# Patient Record
Sex: MALE | Race: WHITE | NOT HISPANIC OR LATINO | Employment: FULL TIME | URBAN - METROPOLITAN AREA
[De-identification: names, ages, dates, MRNs, and addresses within clinical notes are randomized per-mention and may not be internally consistent; named-entity substitution may affect disease eponyms.]

---

## 2024-09-16 ENCOUNTER — OFFICE VISIT (OUTPATIENT)
Dept: ORTHOPEDIC SURGERY | Facility: CLINIC | Age: 24
End: 2024-09-16
Payer: COMMERCIAL

## 2024-09-16 ENCOUNTER — HOSPITAL ENCOUNTER (OUTPATIENT)
Dept: RADIOLOGY | Facility: CLINIC | Age: 24
Discharge: HOME | End: 2024-09-16
Payer: COMMERCIAL

## 2024-09-16 DIAGNOSIS — M75.21 BICEPS TENDONITIS OF BOTH SHOULDERS: Primary | ICD-10-CM

## 2024-09-16 DIAGNOSIS — M25.512 BILATERAL SHOULDER PAIN, UNSPECIFIED CHRONICITY: ICD-10-CM

## 2024-09-16 DIAGNOSIS — M25.511 BILATERAL SHOULDER PAIN, UNSPECIFIED CHRONICITY: ICD-10-CM

## 2024-09-16 DIAGNOSIS — M75.22 BICEPS TENDONITIS OF BOTH SHOULDERS: Primary | ICD-10-CM

## 2024-09-16 PROCEDURE — 99203 OFFICE O/P NEW LOW 30 MIN: CPT | Performed by: ORTHOPAEDIC SURGERY

## 2024-09-16 PROCEDURE — 73030 X-RAY EXAM OF SHOULDER: CPT | Mod: BILATERAL PROCEDURE | Performed by: RADIOLOGY

## 2024-09-16 PROCEDURE — 73030 X-RAY EXAM OF SHOULDER: CPT | Mod: 50

## 2024-09-16 PROCEDURE — 99213 OFFICE O/P EST LOW 20 MIN: CPT | Performed by: ORTHOPAEDIC SURGERY

## 2024-09-16 RX ORDER — MELOXICAM 15 MG/1
15 TABLET ORAL DAILY
Qty: 30 TABLET | Refills: 1 | Status: SHIPPED | OUTPATIENT
Start: 2024-09-16 | End: 2025-09-16

## 2024-09-16 NOTE — PROGRESS NOTES
ORTHOPAEDIC HISTORY AND PHYSICAL    History Of Present Illness  Orthopaedic Problems/Injuries:  Corby Mackay is a 24 y.o. male presenting with bilateral shoulder pain.  The pain has been going on for the last 3 weeks.  Patient reported that he has been doing a lot of weight lifting and about 3 weeks ago he was doing a push-up and felt a pop in the left shoulder.  He had transient weakness in the shoulder however has regained his strength since then.  He has been having progressive shoulder pain.  Pain is worse with activities.  He has stopped lifting since the injury.  He has no weakness in the shoulder currently.  He is having difficulty sleeping on either shoulder.  He is taking ibuprofen without relief.  No numbness or tingling the hand.  No shoulder instability.      Review of Systems: 12 point ROS negative unless stated in HPI    Past Medical History  He has a past medical history of Concussion without loss of consciousness, initial encounter (10/12/2017) and Other specified health status.    Surgical History  He has no past surgical history on file.     Social History  He has no history on file for tobacco use, alcohol use, and drug use.    Family History  No family history on file.     Allergies  Patient has no known allergies.    Review of Systems     Physical Exam  Gen: The patient is alert and oriented ×3, is in no acute distress, and appear their stated age and weight.    Psychiatric: Mood and affect are appropriate.    Eyes: Sclera are white, and pupils are round and symmetric.    ENT: Mucous membranes are moist.     Neck: Supple. Thyroid is midline.    Respiratory: Respirations are nonlabored, chest rise is symmetric.    Cardiac: Rate is regular by palpation of distal pulses.     Abdomen: Nondistended.    Integument: No obvious cutaneous lesions are noted. No signs of lymphangitis. No signs of systemic edema.    Focused exam of the bilateral shoulder reveals no erythema, edema, ecchymosis. No scars,  asymmetry, atrophy, hypertrophy, or scapular winging. No obvious deformity. Nontender to palpation of the SC joint, clavicle, AC joint, greater tubercle, however he had tenderness bilateral bicipital groove. Nontender to palpation of medial and lateral epicondyle, olecranon process. Patient is able to actively forward elevate shoulder to about 170 degrees and passively to about 170 degrees.  Patient had good rotator cuff strength with resistive testing.  Patient did have pain with resistive flexion of the elbow worse with pronation on the left.  The shoulder is stable.          Relevant Results  I personally reviewed bilateral shoulder radiograph and they were normal.    Assessment/Plan   Patient has bilateral shoulder biceps tendinitis.  I discussed with him his shoulder radiograph was negative for any abnormality.  Told is possible that he has a SLAP tear that is not apparent only on MRI will be able to confirm this diagnosis.  However given the weakness in the shoulder and acute nature of the injury I recommended trial of conservative treatment.  I given prescription for meloxicam to be taken as needed for the next 2 weeks.  I discussed the judicious use of nonsteroidal anti-inflammatory medications.  The need to take the medications with food in order to decrease the risk of gastrointestinal complications.  The patient should inform their primary care physician that they are taking nonsteroidal anti-inflammatory medications so that their  kidney and liver function tests can be monitored appropriately.  They should not take these medications over an extended period of time.  I also referred him to outpatient physical therapy range of motion rehabilitation of the shoulder.  I recommend that he avoid lifting for the next few weeks to rest the shoulder.  If patient has persistent pain or symptoms he will come back and see me in about 6 weeks and we can consider getting an MRI of the shoulder.

## 2024-09-20 ENCOUNTER — OFFICE VISIT (OUTPATIENT)
Dept: PRIMARY CARE | Facility: CLINIC | Age: 24
End: 2024-09-20
Payer: COMMERCIAL

## 2024-09-20 ENCOUNTER — LAB (OUTPATIENT)
Dept: LAB | Facility: LAB | Age: 24
End: 2024-09-20
Payer: COMMERCIAL

## 2024-09-20 VITALS
WEIGHT: 177 LBS | DIASTOLIC BLOOD PRESSURE: 72 MMHG | HEART RATE: 51 BPM | OXYGEN SATURATION: 98 % | SYSTOLIC BLOOD PRESSURE: 116 MMHG | TEMPERATURE: 98.4 F | BODY MASS INDEX: 26.14 KG/M2

## 2024-09-20 DIAGNOSIS — M75.20 BICEPS TENDINITIS, UNSPECIFIED LATERALITY: ICD-10-CM

## 2024-09-20 DIAGNOSIS — Z13.220 LIPID SCREENING: ICD-10-CM

## 2024-09-20 DIAGNOSIS — K21.9 GASTROESOPHAGEAL REFLUX DISEASE, UNSPECIFIED WHETHER ESOPHAGITIS PRESENT: ICD-10-CM

## 2024-09-20 DIAGNOSIS — K92.1 MELENA: ICD-10-CM

## 2024-09-20 DIAGNOSIS — Z13.220 LIPID SCREENING: Primary | ICD-10-CM

## 2024-09-20 LAB
ALBUMIN SERPL BCP-MCNC: 5.2 G/DL (ref 3.4–5)
ALP SERPL-CCNC: 60 U/L (ref 33–120)
ALT SERPL W P-5'-P-CCNC: 19 U/L (ref 10–52)
ANION GAP SERPL CALCULATED.3IONS-SCNC: 10 MMOL/L (ref 10–20)
AST SERPL W P-5'-P-CCNC: 19 U/L (ref 9–39)
BASOPHILS # BLD AUTO: 0.03 X10*3/UL (ref 0–0.1)
BASOPHILS NFR BLD AUTO: 0.4 %
BILIRUB SERPL-MCNC: 0.6 MG/DL (ref 0–1.2)
BUN SERPL-MCNC: 21 MG/DL (ref 6–23)
CALCIUM SERPL-MCNC: 9.6 MG/DL (ref 8.6–10.3)
CHLORIDE SERPL-SCNC: 104 MMOL/L (ref 98–107)
CHOLEST SERPL-MCNC: 159 MG/DL (ref 0–199)
CHOLEST/HDLC SERPL: 2.9 {RATIO}
CO2 SERPL-SCNC: 30 MMOL/L (ref 21–32)
CREAT SERPL-MCNC: 0.95 MG/DL (ref 0.5–1.3)
CRP SERPL-MCNC: <0.1 MG/DL
EGFRCR SERPLBLD CKD-EPI 2021: >90 ML/MIN/1.73M*2
EOSINOPHIL # BLD AUTO: 0.04 X10*3/UL (ref 0–0.7)
EOSINOPHIL NFR BLD AUTO: 0.5 %
ERYTHROCYTE [DISTWIDTH] IN BLOOD BY AUTOMATED COUNT: 11.9 % (ref 11.5–14.5)
ERYTHROCYTE [SEDIMENTATION RATE] IN BLOOD BY WESTERGREN METHOD: 4 MM/H (ref 0–15)
GLUCOSE SERPL-MCNC: 92 MG/DL (ref 74–99)
HCT VFR BLD AUTO: 47.2 % (ref 41–52)
HDLC SERPL-MCNC: 54.6 MG/DL
HGB BLD-MCNC: 15.6 G/DL (ref 13.5–17.5)
IMM GRANULOCYTES # BLD AUTO: 0.01 X10*3/UL (ref 0–0.7)
IMM GRANULOCYTES NFR BLD AUTO: 0.1 % (ref 0–0.9)
LDLC SERPL CALC-MCNC: 91 MG/DL
LYMPHOCYTES # BLD AUTO: 2.55 X10*3/UL (ref 1.2–4.8)
LYMPHOCYTES NFR BLD AUTO: 32.9 %
MCH RBC QN AUTO: 29.5 PG (ref 26–34)
MCHC RBC AUTO-ENTMCNC: 33.1 G/DL (ref 32–36)
MCV RBC AUTO: 89 FL (ref 80–100)
MONOCYTES # BLD AUTO: 0.83 X10*3/UL (ref 0.1–1)
MONOCYTES NFR BLD AUTO: 10.7 %
NEUTROPHILS # BLD AUTO: 4.29 X10*3/UL (ref 1.2–7.7)
NEUTROPHILS NFR BLD AUTO: 55.4 %
NON HDL CHOLESTEROL: 104 MG/DL (ref 0–149)
NRBC BLD-RTO: 0 /100 WBCS (ref 0–0)
PLATELET # BLD AUTO: 230 X10*3/UL (ref 150–450)
POTASSIUM SERPL-SCNC: 4.4 MMOL/L (ref 3.5–5.3)
PROT SERPL-MCNC: 7.7 G/DL (ref 6.4–8.2)
RBC # BLD AUTO: 5.29 X10*6/UL (ref 4.5–5.9)
SODIUM SERPL-SCNC: 140 MMOL/L (ref 136–145)
TRIGL SERPL-MCNC: 68 MG/DL (ref 0–149)
VLDL: 14 MG/DL (ref 0–40)
WBC # BLD AUTO: 7.8 X10*3/UL (ref 4.4–11.3)

## 2024-09-20 PROCEDURE — 85652 RBC SED RATE AUTOMATED: CPT

## 2024-09-20 PROCEDURE — 83516 IMMUNOASSAY NONANTIBODY: CPT

## 2024-09-20 PROCEDURE — 99204 OFFICE O/P NEW MOD 45 MIN: CPT | Performed by: STUDENT IN AN ORGANIZED HEALTH CARE EDUCATION/TRAINING PROGRAM

## 2024-09-20 PROCEDURE — 1036F TOBACCO NON-USER: CPT | Performed by: STUDENT IN AN ORGANIZED HEALTH CARE EDUCATION/TRAINING PROGRAM

## 2024-09-20 PROCEDURE — 80053 COMPREHEN METABOLIC PANEL: CPT

## 2024-09-20 PROCEDURE — 80061 LIPID PANEL: CPT

## 2024-09-20 PROCEDURE — 36415 COLL VENOUS BLD VENIPUNCTURE: CPT

## 2024-09-20 PROCEDURE — 85025 COMPLETE CBC W/AUTO DIFF WBC: CPT

## 2024-09-20 PROCEDURE — 86140 C-REACTIVE PROTEIN: CPT

## 2024-09-20 RX ORDER — DICLOFENAC SODIUM 30 MG/G
GEL TOPICAL 2 TIMES DAILY
Qty: 100 G | Refills: 1 | Status: SHIPPED | OUTPATIENT
Start: 2024-09-20

## 2024-09-20 RX ORDER — OMEPRAZOLE 40 MG/1
40 CAPSULE, DELAYED RELEASE ORAL
Qty: 30 CAPSULE | Refills: 2 | Status: SHIPPED | OUTPATIENT
Start: 2024-09-20 | End: 2024-09-20

## 2024-09-20 RX ORDER — SUCRALFATE 1 G/10ML
1 SUSPENSION ORAL 4 TIMES DAILY
Qty: 1200 ML | Refills: 0 | Status: SHIPPED | OUTPATIENT
Start: 2024-09-20 | End: 2024-09-20

## 2024-09-20 RX ORDER — SUCRALFATE 1 G/10ML
SUSPENSION ORAL
Qty: 3600 ML | Refills: 0 | Status: SHIPPED | OUTPATIENT
Start: 2024-09-20

## 2024-09-20 RX ORDER — OMEPRAZOLE 40 MG/1
CAPSULE, DELAYED RELEASE ORAL
Qty: 90 CAPSULE | Refills: 0 | Status: SHIPPED | OUTPATIENT
Start: 2024-09-20

## 2024-09-20 ASSESSMENT — ENCOUNTER SYMPTOMS
SLEEP DISTURBANCE: 0
CONSTIPATION: 0
SINUS PRESSURE: 0
WHEEZING: 0
MYALGIAS: 0
LIGHT-HEADEDNESS: 0
VOMITING: 1
FEVER: 0
HEADACHES: 0
ARTHRALGIAS: 0
DIZZINESS: 0
COUGH: 0
CHILLS: 0
ABDOMINAL DISTENTION: 1
NERVOUS/ANXIOUS: 0
WOUND: 0
UNEXPECTED WEIGHT CHANGE: 1
FATIGUE: 0
RHINORRHEA: 0
FREQUENCY: 0
PALPITATIONS: 0
DIARRHEA: 0
NAUSEA: 1
DYSPHORIC MOOD: 0
SHORTNESS OF BREATH: 0
ABDOMINAL PAIN: 1
DYSURIA: 0
SINUS PAIN: 0
POLYDIPSIA: 0

## 2024-09-20 ASSESSMENT — PATIENT HEALTH QUESTIONNAIRE - PHQ9
1. LITTLE INTEREST OR PLEASURE IN DOING THINGS: NOT AT ALL
SUM OF ALL RESPONSES TO PHQ9 QUESTIONS 1 AND 2: 0
2. FEELING DOWN, DEPRESSED OR HOPELESS: NOT AT ALL

## 2024-09-20 ASSESSMENT — PAIN SCALES - GENERAL: PAINLEVEL: 4

## 2024-09-20 NOTE — PROGRESS NOTES
Subjective   Patient ID: Corby Mackay is a 24 y.o. male who presents for New Patient Visit.    Patient is here to establish care today.  Patient is also having some ongoing GI issues.    Patient has a longstanding history of recurrent nausea and stomach pain.  Recently patient was seen at [facility] for bicipital tendinitis, prescribed daily meloxicam.  He has been taking this, and having worsening stomach pain, nausea, and vomiting.  He was advised to take this medication only on a full stomach, however he had not been able to keep food down so he was taking it on an empty stomach as well.  Now he is having ongoing stabbing stomach pain, nausea, and vomiting.  Vomitus is described as stomach contents, denies any blood or coffee-ground type material.    Patient has also been having some lower GI symptoms.  He does report intermittent dark stools.  He states his stools also typically will float or dissolved in water when he goes to the bathroom.  Reports he has been losing weight since the symptoms started.  He is lost approximately 5 pounds in the past week.        Review of Systems   Constitutional:  Positive for unexpected weight change. Negative for chills, fatigue and fever.   HENT:  Negative for congestion, hearing loss, rhinorrhea, sinus pressure, sinus pain and tinnitus.    Eyes:  Negative for visual disturbance.   Respiratory:  Negative for cough, shortness of breath and wheezing.    Cardiovascular:  Negative for chest pain, palpitations and leg swelling.   Gastrointestinal:  Positive for abdominal distention, abdominal pain, nausea and vomiting. Negative for constipation and diarrhea.   Endocrine: Negative for cold intolerance, heat intolerance, polydipsia and polyuria.   Genitourinary:  Negative for dysuria, frequency and urgency.   Musculoskeletal:  Negative for arthralgias and myalgias.   Skin:  Negative for pallor, rash and wound.   Neurological:  Negative for dizziness, light-headedness and  headaches.   Psychiatric/Behavioral:  Negative for dysphoric mood and sleep disturbance. The patient is not nervous/anxious.        Objective     Visit Vitals  /72 (BP Location: Left arm)   Pulse 51   Temp 36.9 °C (98.4 °F) (Temporal)   Wt 80.3 kg (177 lb)   SpO2 98%   BMI 26.14 kg/m²   Smoking Status Never   BSA 1.98 m²         Physical Exam  Constitutional:       Appearance: Normal appearance. He is obese.   HENT:      Head: Normocephalic and atraumatic.      Right Ear: Tympanic membrane, ear canal and external ear normal.      Left Ear: Tympanic membrane, ear canal and external ear normal.      Nose: Nose normal.      Mouth/Throat:      Mouth: Mucous membranes are moist.      Pharynx: Oropharynx is clear.   Eyes:      Extraocular Movements: Extraocular movements intact.      Conjunctiva/sclera: Conjunctivae normal.      Pupils: Pupils are equal, round, and reactive to light.   Cardiovascular:      Rate and Rhythm: Normal rate and regular rhythm.      Pulses: Normal pulses.      Heart sounds: Normal heart sounds.   Pulmonary:      Effort: Pulmonary effort is normal.      Breath sounds: Normal breath sounds.   Abdominal:      General: There is no distension.      Palpations: Abdomen is soft. There is no mass.      Tenderness: There is abdominal tenderness (Left and right upper quadrant). There is guarding (Left upper quadrant).      Hernia: No hernia is present.   Musculoskeletal:         General: Normal range of motion.   Skin:     General: Skin is warm and dry.   Neurological:      Mental Status: He is alert and oriented to person, place, and time. Mental status is at baseline.   Psychiatric:         Mood and Affect: Mood normal.         Behavior: Behavior normal.         Assessment & Plan  Lipid screening    Orders:    Comprehensive metabolic panel; Future    Lipid panel; Future    CBC and Auto Differential; Future    Gastroesophageal reflux disease, unspecified whether esophagitis present    Orders:     omeprazole (PriLOSEC) 40 mg DR capsule; Take 1 capsule (40 mg) by mouth once daily in the morning. Take before meals. Do not crush or chew.    sucralfate (Carafate) 100 mg/mL suspension; Take 10 mL (1 g) by mouth 4 times a day.    Referral to Gastroenterology; Future    Celiac Panel; Future    H. pylori antigen, stool; Future    CBC and Auto Differential; Future  Patient was advised to provide stool sample prior to starting omeprazole.  Melena    Orders:    Referral to Gastroenterology; Future    Celiac Panel; Future    Sedimentation Rate; Future    C-reactive protein; Future    Calprotectin Stool; Future    Fecal Fat, Qualitative; Future    Occult Blood, Stool; Future    CBC and Auto Differential; Future    Biceps tendinitis, unspecified laterality    Orders:    diclofenac sodium 3 % gel; Apply topically 2 times a day.  Given current GI symptoms, patient is to abstain from oral NSAIDs.  Topical diclofenac is an acceptable option at the moment.  He could also take acetaminophen.     Reviewed and approved by ESAU BEAR on 9/20/24 at 11:12 AM.

## 2024-09-21 LAB
GLIADIN PEPTIDE IGA SER IA-ACNC: <1 U/ML
TTG IGA SER IA-ACNC: <1 U/ML

## 2024-09-23 ENCOUNTER — LAB (OUTPATIENT)
Dept: LAB | Facility: LAB | Age: 24
End: 2024-09-23
Payer: COMMERCIAL

## 2024-09-23 DIAGNOSIS — K92.1 MELENA: ICD-10-CM

## 2024-09-23 DIAGNOSIS — K21.9 GASTROESOPHAGEAL REFLUX DISEASE, UNSPECIFIED WHETHER ESOPHAGITIS PRESENT: ICD-10-CM

## 2024-09-23 LAB
GLIADIN PEPTIDE IGG SER IA-ACNC: <0.56 FLU (ref 0–4.99)
HEMOCCULT SP1 STL QL: NEGATIVE
TTG IGG SER IA-ACNC: <0.82 FLU (ref 0–4.99)

## 2024-09-23 PROCEDURE — 83993 ASSAY FOR CALPROTECTIN FECAL: CPT

## 2024-09-23 PROCEDURE — 87449 NOS EACH ORGANISM AG IA: CPT

## 2024-09-23 PROCEDURE — 89125 SPECIMEN FAT STAIN: CPT

## 2024-09-23 PROCEDURE — 82270 OCCULT BLOOD FECES: CPT

## 2024-09-25 LAB
FAT STL QL: NORMAL
H PYLORI AG STL QL IA: NEGATIVE
NEUTRAL FAT STL QL: NORMAL

## 2024-09-26 LAB — CALPROTECTIN STL-MCNT: 9 UG/G

## 2024-11-04 DIAGNOSIS — M25.512 BILATERAL SHOULDER PAIN, UNSPECIFIED CHRONICITY: ICD-10-CM

## 2024-11-04 DIAGNOSIS — M25.511 BILATERAL SHOULDER PAIN, UNSPECIFIED CHRONICITY: ICD-10-CM

## 2024-11-04 RX ORDER — MELOXICAM 15 MG/1
15 TABLET ORAL DAILY
Qty: 30 TABLET | Refills: 1 | Status: SHIPPED | OUTPATIENT
Start: 2024-11-04 | End: 2025-01-03

## 2024-11-08 ENCOUNTER — OFFICE VISIT (OUTPATIENT)
Dept: GASTROENTEROLOGY | Facility: CLINIC | Age: 24
End: 2024-11-08
Payer: COMMERCIAL

## 2024-11-08 VITALS
DIASTOLIC BLOOD PRESSURE: 78 MMHG | SYSTOLIC BLOOD PRESSURE: 114 MMHG | TEMPERATURE: 97.8 F | WEIGHT: 178 LBS | BODY MASS INDEX: 26.36 KG/M2 | HEART RATE: 58 BPM | HEIGHT: 69 IN

## 2024-11-08 DIAGNOSIS — R11.2 NAUSEA AND VOMITING, UNSPECIFIED VOMITING TYPE: ICD-10-CM

## 2024-11-08 DIAGNOSIS — R15.2 FECAL URGENCY: ICD-10-CM

## 2024-11-08 DIAGNOSIS — R10.9 ABDOMINAL PAIN, UNSPECIFIED ABDOMINAL LOCATION: ICD-10-CM

## 2024-11-08 DIAGNOSIS — R10.11 ABDOMINAL PAIN, CHRONIC, RIGHT UPPER QUADRANT: ICD-10-CM

## 2024-11-08 DIAGNOSIS — K21.9 GASTROESOPHAGEAL REFLUX DISEASE, UNSPECIFIED WHETHER ESOPHAGITIS PRESENT: ICD-10-CM

## 2024-11-08 DIAGNOSIS — R11.0 NAUSEA: Primary | ICD-10-CM

## 2024-11-08 DIAGNOSIS — R19.5 LOOSE STOOLS: ICD-10-CM

## 2024-11-08 DIAGNOSIS — R11.10 RETCHING: ICD-10-CM

## 2024-11-08 DIAGNOSIS — G89.29 ABDOMINAL PAIN, CHRONIC, RIGHT UPPER QUADRANT: ICD-10-CM

## 2024-11-08 PROCEDURE — 99203 OFFICE O/P NEW LOW 30 MIN: CPT | Performed by: NURSE PRACTITIONER

## 2024-11-08 PROCEDURE — 1036F TOBACCO NON-USER: CPT | Performed by: NURSE PRACTITIONER

## 2024-11-08 PROCEDURE — 3008F BODY MASS INDEX DOCD: CPT | Performed by: NURSE PRACTITIONER

## 2024-11-08 PROCEDURE — 99213 OFFICE O/P EST LOW 20 MIN: CPT | Performed by: NURSE PRACTITIONER

## 2024-11-08 ASSESSMENT — ENCOUNTER SYMPTOMS
HEMATURIA: 0
FREQUENCY: 0
SHORTNESS OF BREATH: 0
LIGHT-HEADEDNESS: 0
FEVER: 0
WHEEZING: 0
FLANK PAIN: 0
ADENOPATHY: 0
DYSURIA: 0
COUGH: 0
SORE THROAT: 0
BACK PAIN: 0
EYE PAIN: 0
DIZZINESS: 0
FATIGUE: 0
PALPITATIONS: 0
HALLUCINATIONS: 0
PHOTOPHOBIA: 0
JOINT SWELLING: 0
AGITATION: 0
ARTHRALGIAS: 0
HEADACHES: 0
DIAPHORESIS: 0
MYALGIAS: 0
NUMBNESS: 0
NERVOUS/ANXIOUS: 0
CHILLS: 0
WEAKNESS: 0

## 2024-11-08 ASSESSMENT — PAIN SCALES - GENERAL: PAINLEVEL_OUTOF10: 0-NO PAIN

## 2024-11-08 NOTE — PATIENT INSTRUCTIONS
Thanks for coming to the GI clinic.     I would stop using marijuana as it can cause nausea/vomiting.     Try OTC IBgard.     Follow up as needed.

## 2024-11-08 NOTE — PROGRESS NOTES
Subjective   Patient ID: Corby Mackay is a 24 y.o. male who presents for nausea.     This is a 24 year old WM with history of marijuana use and anxiety who is presenting to the GI clinic for an initial visit.     History per pt and review of EMR     Of note, pt saw Christel Crawford CNP in the  GI clinic in .    Pt is here per PCP referral.    Reports taking sertraline in the past, but he stopped on his own accord as he did not like how it made him feel.     Reports around 2 years ago he began having stabbing RUQ abdominal pain after eating a chicken pot pie. He still gets the pain, but it's not as severe as prior. The pain lasts 5 minutes.     Reports for the past 2 years he's been having intermittent nausea.     ROS positive for occasional dry heaving and vomiting related to the nausea.     Reports a month ago he began having upper abdominal pain and severe constipation with small pellet stools lasting 2-3 weeks. Symptoms started after his dog .     Reports for the past 2 years he's been having issues with fecal urgency with loose stools after lunchtime.    ROS positive for occasional abdominal pain related to the loose stools, but not always. Feels a sense of incomplete bowel evacuation.     Has 1-2 bowel movements per day. First stool is formed. Second stool is loose (Treutlen type 6).     Diet includes gluten.     Denies unintentional weight loss, heartburn, dysphagia, odynophagia, hematemesis, hematochezia, and melena.     RUQ abdominal US unremarkable (21 )     TSH normal ()      Recent work up per PCP as below (2024):  FOBT negative   Fecal calprotectin normal  CRP normal  ESR normal  Celiac panel normal   Qualitative fecal fat normal   H pylori stool antigen negative   CMP grossly normal     Lab Results   Component Value Date    WBC 7.8 2024    HGB 15.6 2024    HCT 47.2 2024    MCV 89 2024     2024       Past medical history:   See above     Past  "surgical history:   PE tubes as a child   Frenulum removal (dental surgery)     Family history:   No GI cancers   Father- appendectomy;  \"stomach tied\"     Social history:  Social alcohol use on Fridays and Saturdays; denies alcohol abuse  Smokes marijuana every night  Quit vaping nicotine around 2 years ago   Girlfriend is a PA with          Review of Systems   Constitutional:  Negative for chills, diaphoresis, fatigue and fever.   HENT:  Negative for congestion, ear pain, hearing loss, sneezing and sore throat.    Eyes:  Negative for photophobia, pain and visual disturbance.   Respiratory:  Negative for cough, shortness of breath and wheezing.    Cardiovascular:  Negative for chest pain, palpitations and leg swelling.   Endocrine: Negative for cold intolerance and heat intolerance.   Genitourinary:  Negative for dysuria, flank pain, frequency and hematuria.   Musculoskeletal:  Negative for arthralgias, back pain, gait problem, joint swelling and myalgias.   Skin:  Negative for rash.   Neurological:  Negative for dizziness, syncope, weakness, light-headedness, numbness and headaches.   Hematological:  Negative for adenopathy.   Psychiatric/Behavioral:  Negative for agitation and hallucinations. The patient is not nervous/anxious.        Allergies   Allergen Reactions    Terbinafine Rash     Current Outpatient Medications   Medication Sig Dispense Refill    meloxicam (Mobic) 15 mg tablet Take 1 tablet (15 mg) by mouth once daily. (Patient not taking: Reported on 11/8/2024) 30 tablet 1     No current facility-administered medications for this visit.        Objective     /78   Pulse 58   Temp 36.6 °C (97.8 °F) (Temporal)   Ht 1.753 m (5' 9\")   Wt 80.7 kg (178 lb)   BMI 26.29 kg/m²     Physical Exam  Constitutional:       General: He is not in acute distress.     Appearance: Normal appearance.   HENT:      Head: Normocephalic and atraumatic.   Eyes:      Conjunctiva/sclera: Conjunctivae normal. "   Cardiovascular:      Rate and Rhythm: Normal rate and regular rhythm.      Heart sounds: No murmur heard.     No gallop.   Pulmonary:      Effort: Pulmonary effort is normal.      Breath sounds: Normal breath sounds.   Abdominal:      General: Bowel sounds are normal. There is no distension.      Tenderness: There is no abdominal tenderness. There is no guarding.   Musculoskeletal:         General: No swelling or deformity. Normal range of motion.      Cervical back: Normal range of motion. No rigidity.   Skin:     General: Skin is warm and dry.      Coloration: Skin is not jaundiced.      Findings: No lesion or rash.   Neurological:      General: No focal deficit present.      Mental Status: He is alert and oriented to person, place, and time.   Psychiatric:         Mood and Affect: Mood normal.         Assessment/Plan   Problem List Items Addressed This Visit    None  Visit Diagnoses       Nausea    -  Primary    Gastroesophageal reflux disease, unspecified whether esophagitis present        Nausea and vomiting, unspecified vomiting type        Retching        Abdominal pain, chronic, right upper quadrant        Fecal urgency        Loose stools        Abdominal pain, unspecified abdominal location               Nausea, vomiting, dry heaving: overall suspect this is related to habitual marijuana use.   - recommend cessation of marijuana    2. Fecal urgency with loose stools and occasional abdominal pain: suspect functional etiology, most likely IBS related in the setting of recent IBS-C symptoms. Reassuringly he had an extensive work up which came back negative.   - advise trial of OTC IBgard    3. Chronic RUQ abdominal pain: most likely functional in nature. Given pain is brief, I doubt biliary pathology especially with normal abdominal US previously.     4. Follow up:  - pt plans to follow up as needed

## 2024-11-13 ENCOUNTER — APPOINTMENT (OUTPATIENT)
Dept: GASTROENTEROLOGY | Facility: CLINIC | Age: 24
End: 2024-11-13
Payer: COMMERCIAL

## 2025-04-21 ENCOUNTER — APPOINTMENT (OUTPATIENT)
Dept: PRIMARY CARE | Facility: CLINIC | Age: 25
End: 2025-04-21
Payer: COMMERCIAL

## 2025-04-21 VITALS
TEMPERATURE: 98 F | HEART RATE: 51 BPM | SYSTOLIC BLOOD PRESSURE: 104 MMHG | WEIGHT: 191 LBS | BODY MASS INDEX: 28.21 KG/M2 | OXYGEN SATURATION: 97 % | DIASTOLIC BLOOD PRESSURE: 66 MMHG

## 2025-04-21 DIAGNOSIS — F51.02 ADJUSTMENT INSOMNIA: Primary | ICD-10-CM

## 2025-04-21 PROBLEM — R41.840 POOR CONCENTRATION: Status: ACTIVE | Noted: 2025-04-21

## 2025-04-21 PROBLEM — H52.03 HYPERMETROPIA OF BOTH EYES: Status: ACTIVE | Noted: 2017-02-04

## 2025-04-21 PROBLEM — K21.9 GERD (GASTROESOPHAGEAL REFLUX DISEASE): Status: ACTIVE | Noted: 2025-04-21

## 2025-04-21 PROBLEM — Q14.1 CONGENITAL HYPERTROPHY OF RETINAL PIGMENT EPITHELIUM: Status: ACTIVE | Noted: 2017-02-04

## 2025-04-21 PROBLEM — F41.9 ANXIETY AND DEPRESSION: Status: ACTIVE | Noted: 2025-04-21

## 2025-04-21 PROBLEM — F32.A ANXIETY AND DEPRESSION: Status: ACTIVE | Noted: 2025-04-21

## 2025-04-21 PROBLEM — E66.3 OVERWEIGHT WITH BODY MASS INDEX (BMI) 25.0-29.9: Status: ACTIVE | Noted: 2025-04-21

## 2025-04-21 PROCEDURE — 1036F TOBACCO NON-USER: CPT | Performed by: STUDENT IN AN ORGANIZED HEALTH CARE EDUCATION/TRAINING PROGRAM

## 2025-04-21 PROCEDURE — 99213 OFFICE O/P EST LOW 20 MIN: CPT | Performed by: STUDENT IN AN ORGANIZED HEALTH CARE EDUCATION/TRAINING PROGRAM

## 2025-04-21 ASSESSMENT — ENCOUNTER SYMPTOMS
SHORTNESS OF BREATH: 0
FREQUENCY: 0
FATIGUE: 0
INSOMNIA: 1
DYSPHORIC MOOD: 0
DIARRHEA: 0
ARTHRALGIAS: 0
MYALGIAS: 0
SLEEP DISTURBANCE: 1
SINUS PAIN: 0
DYSURIA: 0
SINUS PRESSURE: 0
CHILLS: 0
WOUND: 0
NAUSEA: 0
WHEEZING: 0
RHINORRHEA: 0
PALPITATIONS: 0
CONSTIPATION: 0
VOMITING: 0
NERVOUS/ANXIOUS: 1
FEVER: 0
COUGH: 0
DECREASED CONCENTRATION: 0

## 2025-04-21 ASSESSMENT — PAIN SCALES - GENERAL: PAINLEVEL_OUTOF10: 0-NO PAIN

## 2025-04-21 NOTE — PROGRESS NOTES
Subjective   Patient ID: Corby Mackay is a 24 y.o. male who presents for Insomnia (C/o trouble falling asleep and staying asleep x 2 weeks/Happens everyday, wakes up at 4 am everyday).    Here with difficulty sleeping for past 2 weeks.  Has been an issue since April 9 - nothing unusual that day that he is aware of.  Has been waking up at 0400 every day since that time.  Has been having increasing difficulty falling asleep in the first place over that time.  Now getting around 3 hrs of sleep per night last couple of days.     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     Family member gave him a trazodone which did not help.  Then an ambien yesterday, which allowed him to sleep, but now extremely groggy.     Insomnia  Pertinent negatives include no arthralgias, chest pain, chills, congestion, coughing, fatigue, fever, myalgias, nausea, rash or vomiting.       Review of Systems   Constitutional:  Negative for chills, fatigue and fever.   HENT:  Negative for congestion, rhinorrhea, sinus pressure and sinus pain.    Respiratory:  Negative for cough, shortness of breath and wheezing.    Cardiovascular:  Negative for chest pain and palpitations.   Gastrointestinal:  Negative for constipation, diarrhea, nausea and vomiting.   Genitourinary:  Negative for dysuria, frequency and urgency.   Musculoskeletal:  Negative for arthralgias and myalgias.   Skin:  Negative for pallor, rash and wound.   Psychiatric/Behavioral:  Positive for sleep disturbance. Negative for decreased concentration and dysphoric mood. The patient is nervous/anxious and has insomnia.        Objective     Visit Vitals  /66   Pulse 51   Temp 36.7 °C (98 °F)   Wt 86.6 kg (191 lb)   SpO2 97%   BMI 28.21 kg/m²   Smoking Status Never   BSA 2.05 m²         Physical Exam  Constitutional:       Appearance: Normal appearance.   HENT:      Head: Normocephalic and atraumatic.      Right Ear: External ear  normal.      Left Ear: External ear normal.   Cardiovascular:      Rate and Rhythm: Normal rate and regular rhythm.      Pulses: Normal pulses.      Heart sounds: Normal heart sounds.   Pulmonary:      Effort: Pulmonary effort is normal.      Breath sounds: Normal breath sounds.   Abdominal:      General: Abdomen is flat.      Palpations: Abdomen is soft.   Neurological:      Mental Status: He is alert and oriented to person, place, and time.   Psychiatric:         Mood and Affect: Mood normal.         Behavior: Behavior normal.         Assessment & Plan  Adjustment insomnia    Orders:    lemborexant (Dayvigo) 5 mg tablet; Take 1 tablet (5 mg) by mouth as needed at bedtime for sleep.  Patient advised that this medication is typically quite expensive.  He is to go online and sign up for a coupon card through the  before picking up at the pharmacy.    OARRS: Esau Bear DO on 4/21/2025  9:15 AM  I have personally reviewed the OARRS report for Corby Mackay. I have considered the risks of abuse, dependence, addiction and diversion.    Discussed with the patient that we should plan to use this medication short-term to help him reestablish his normal sleep patterns.  He does not wish to take medication long-term, and I am in agreement with this plan.       Reviewed and approved by ESAU BEAR on 4/21/25 at 9:03 PM.

## 2025-04-29 ENCOUNTER — APPOINTMENT (OUTPATIENT)
Dept: PRIMARY CARE | Facility: CLINIC | Age: 25
End: 2025-04-29
Payer: COMMERCIAL

## 2025-06-11 ENCOUNTER — OFFICE VISIT (OUTPATIENT)
Dept: PRIMARY CARE | Facility: CLINIC | Age: 25
End: 2025-06-11
Payer: COMMERCIAL

## 2025-06-11 VITALS
WEIGHT: 196 LBS | DIASTOLIC BLOOD PRESSURE: 80 MMHG | HEIGHT: 69 IN | OXYGEN SATURATION: 97 % | BODY MASS INDEX: 29.03 KG/M2 | SYSTOLIC BLOOD PRESSURE: 108 MMHG | HEART RATE: 50 BPM

## 2025-06-11 DIAGNOSIS — F51.02 ADJUSTMENT INSOMNIA: Primary | ICD-10-CM

## 2025-06-11 PROCEDURE — 3008F BODY MASS INDEX DOCD: CPT | Performed by: STUDENT IN AN ORGANIZED HEALTH CARE EDUCATION/TRAINING PROGRAM

## 2025-06-11 PROCEDURE — 99213 OFFICE O/P EST LOW 20 MIN: CPT | Performed by: STUDENT IN AN ORGANIZED HEALTH CARE EDUCATION/TRAINING PROGRAM

## 2025-06-11 PROCEDURE — 1036F TOBACCO NON-USER: CPT | Performed by: STUDENT IN AN ORGANIZED HEALTH CARE EDUCATION/TRAINING PROGRAM

## 2025-06-11 RX ORDER — TRAZODONE HYDROCHLORIDE 50 MG/1
50 TABLET ORAL NIGHTLY PRN
Qty: 7 TABLET | Refills: 0 | Status: SHIPPED | OUTPATIENT
Start: 2025-06-11 | End: 2026-06-11

## 2025-06-11 RX ORDER — TRAZODONE HYDROCHLORIDE 50 MG/1
50 TABLET ORAL NIGHTLY PRN
Qty: 30 TABLET | Refills: 0 | Status: SHIPPED | OUTPATIENT
Start: 2025-06-11 | End: 2025-06-11 | Stop reason: WASHOUT

## 2025-06-11 ASSESSMENT — ENCOUNTER SYMPTOMS
POLYDIPSIA: 0
CHILLS: 0
NERVOUS/ANXIOUS: 1
COUGH: 0
SHORTNESS OF BREATH: 0
SINUS PRESSURE: 0
CONSTIPATION: 0
ARTHRALGIAS: 0
RHINORRHEA: 0
SLEEP DISTURBANCE: 1
FATIGUE: 0
PALPITATIONS: 0
DYSURIA: 0
DIZZINESS: 0
HEADACHES: 0
WOUND: 0
VOMITING: 0
LIGHT-HEADEDNESS: 0
MYALGIAS: 0
WHEEZING: 0
FEVER: 0
DYSPHORIC MOOD: 0
SINUS PAIN: 0
DIARRHEA: 0
FREQUENCY: 0
NAUSEA: 0

## 2025-06-11 ASSESSMENT — PAIN SCALES - GENERAL: PAINLEVEL_OUTOF10: 0-NO PAIN

## 2025-06-11 ASSESSMENT — COLUMBIA-SUICIDE SEVERITY RATING SCALE - C-SSRS: 1. IN THE PAST MONTH, HAVE YOU WISHED YOU WERE DEAD OR WISHED YOU COULD GO TO SLEEP AND NOT WAKE UP?: NO

## 2025-06-11 ASSESSMENT — PATIENT HEALTH QUESTIONNAIRE - PHQ9
SUM OF ALL RESPONSES TO PHQ9 QUESTIONS 1 AND 2: 0
2. FEELING DOWN, DEPRESSED OR HOPELESS: NOT AT ALL
1. LITTLE INTEREST OR PLEASURE IN DOING THINGS: NOT AT ALL

## 2025-06-11 NOTE — PROGRESS NOTES
Subjective   Patient ID: Corby Mackay is a 24 y.o. male who presents for Follow-up (Patient states he is having trouble sleeping for the past two months . Bhe states even with medication its still hard.).    Here today to follow up regarding insomnia.     To recap, this has been a recent problem:  Trazodone did not help at all  Ambien caused significant side effects and would not want to take again.   Dayvigo is somewhat helpful, but does cause some drowsiness.    Evening routine:  Exercise around 6:00 PM.  Eats several hours before bed.  Does not eat anything large right before bed.   Will relax on the couch until tired, then brush teeth and go to bedroom.  Will be on phone until bed.   Sometimes will get distracted from going to sleep by abdominal discomfort.   Bedroom is kept cool/comfortable.   Gets up at the same time every day.    Estimates he is getting around 5 hours of sleep per night now.  However bad nights are 2-3 hours still.   Does not take naps.   Sometimes sleeps in.         Review of Systems   Constitutional:  Negative for chills, fatigue and fever.   HENT:  Negative for congestion, hearing loss, rhinorrhea, sinus pressure, sinus pain and tinnitus.    Eyes:  Negative for visual disturbance.   Respiratory:  Negative for cough, shortness of breath and wheezing.    Cardiovascular:  Negative for chest pain, palpitations and leg swelling.   Gastrointestinal:  Negative for constipation, diarrhea, nausea and vomiting.   Endocrine: Negative for cold intolerance, heat intolerance, polydipsia and polyuria.   Genitourinary:  Negative for dysuria, frequency and urgency.   Musculoskeletal:  Negative for arthralgias and myalgias.   Skin:  Negative for pallor, rash and wound.   Neurological:  Negative for dizziness, light-headedness and headaches.   Psychiatric/Behavioral:  Positive for sleep disturbance. Negative for dysphoric mood. The patient is nervous/anxious.        Objective     Visit Vitals  /80  "  Pulse 50   Ht 1.753 m (5' 9\")   Wt 88.9 kg (196 lb)   SpO2 97%   BMI 28.94 kg/m²   Smoking Status Never   BSA 2.08 m²         Physical Exam  Constitutional:       Appearance: Normal appearance.   HENT:      Head: Normocephalic and atraumatic.      Mouth/Throat:      Mouth: Mucous membranes are moist.      Pharynx: Oropharynx is clear.   Cardiovascular:      Rate and Rhythm: Normal rate and regular rhythm.      Heart sounds: Normal heart sounds.   Pulmonary:      Effort: Pulmonary effort is normal.      Breath sounds: Normal breath sounds.   Skin:     General: Skin is warm and dry.   Neurological:      Mental Status: He is alert and oriented to person, place, and time.   Psychiatric:         Mood and Affect: Mood normal.         Behavior: Behavior normal.         Assessment & Plan  Adjustment insomnia    Orders:    Referral to Adult Sleep Medicine; Future    lemborexant (Dayvigo) 5 mg tablet; Take 1 tablet (5 mg) by mouth once daily at bedtime.    traZODone (Desyrel) 50 mg tablet; Take 1 tablet (50 mg) by mouth as needed at bedtime for sleep. Try without Dayvigo.  discussed sleep hygiene / avoiding electronics for 1 hours before bed.   Discussed avoiding sleeping in and keeping consistent wake up time.   Discussed listening to mild noise / music.  Interested in trying trazodone again - no idea what does he took before, had half a tablet of some amount that didn't help.    Discussed that studies have shown consistent use of VERONIQUE medications such as Dayvigo leads to improved patient response to this type of medication.  Would keep at 5 mg as he reports some grogginess in the morning after taking.    Patient also seems to have some component of anxiety as well.  Declined further evaluation of or treatment for this at this time.        Reviewed and approved by ESAU BEAR on 6/11/25 at 11:07 AM.     "

## 2025-06-13 ENCOUNTER — APPOINTMENT (OUTPATIENT)
Dept: PRIMARY CARE | Facility: CLINIC | Age: 25
End: 2025-06-13
Payer: COMMERCIAL